# Patient Record
Sex: MALE | Race: WHITE | NOT HISPANIC OR LATINO | URBAN - METROPOLITAN AREA
[De-identification: names, ages, dates, MRNs, and addresses within clinical notes are randomized per-mention and may not be internally consistent; named-entity substitution may affect disease eponyms.]

---

## 2017-08-24 ENCOUNTER — GENERIC CONVERSION - ENCOUNTER (OUTPATIENT)
Dept: OTHER | Facility: OTHER | Age: 72
End: 2017-08-24

## 2017-09-01 ENCOUNTER — GENERIC CONVERSION - ENCOUNTER (OUTPATIENT)
Dept: OTHER | Facility: OTHER | Age: 72
End: 2017-09-01

## 2017-10-06 ENCOUNTER — GENERIC CONVERSION - ENCOUNTER (OUTPATIENT)
Dept: OTHER | Facility: OTHER | Age: 72
End: 2017-10-06

## 2017-11-09 ENCOUNTER — GENERIC CONVERSION - ENCOUNTER (OUTPATIENT)
Dept: OTHER | Facility: OTHER | Age: 72
End: 2017-11-09

## 2017-11-10 ENCOUNTER — GENERIC CONVERSION - ENCOUNTER (OUTPATIENT)
Dept: OTHER | Facility: OTHER | Age: 72
End: 2017-11-10

## 2017-12-01 ENCOUNTER — GENERIC CONVERSION - ENCOUNTER (OUTPATIENT)
Dept: OTHER | Facility: OTHER | Age: 72
End: 2017-12-01

## 2018-01-13 NOTE — MISCELLANEOUS
Message   Recorded as Task   Date: 11/09/2017 04:13 PM, Created By: Rosana Delgado   Task Name: Medical Complaint Callback   Assigned To: Mejia CRUZ,TEAM   Regarding Patient: Che Gutierrez, Status: Active   Comment:    Jil Bullock - 09 Nov 2017 4:13 PM     TASK CREATED  Caller: Latia, Adult Child; [phone]  Daughter calling with numerous questions regarding pt's incontinence & possible infection,an appointment was scheduled earlier for tomorrow which is difficult since pt's has dementia she would like to speak with nurse or Adrienne Maddox - 09 Nov 2017 4:52 PM     TASK EDITED  DAUGHTER WILL KEEP APPT UNLESS SHE HEARS FROM DR Yovani Huynh  FEELS NOTHING LEFT TO BE DONE IN OFFICE RE: TESTING  INCONTINENCE WORSE  SHE DOES NOT THINK DUMONT WOULD BE APPROPRIATE DUE TO DEMENTIA  WILL DIRECT TO DR Yovani Huynh  Active Problems    1  Urge incontinence of urine (134 31) (N39 41)    Current Meds   1  Ativan 1 MG Oral Tablet (LORazepam); Therapy: (Recorded:31Aug2017) to Recorded   2  Creon CAPS; Therapy: (Recorded:01Sep2017) to Recorded   3  Cyanocobalamin TABS; Therapy: (Recorded:31Aug2017) to Recorded   4  Daily Multivitamin TABS; Therapy: (Recorded:01Sep2017) to Recorded   5  Finasteride 5 MG Oral Tablet (Proscar); TAKE 1 TABLET DAILY AS DIRECTED    Requested for: [id number]; Last UT:[id number]; Status: ACTIVE - Transmit to Pharmacy   - Awaiting Verification Ordered   6  Lexapro 20 MG Oral Tablet (Escitalopram Oxalate); Therapy: (Recorded:31Aug2017) to Recorded   7  Myrbetriq 50 MG Oral Tablet Extended Release 24 Hour; Take 1 tablet daily; Therapy: 01Sep2017 to (Evaluate:28Jun2018); Last Rx:01Sep2017 Ordered   8  Oxybutynin Chloride 5 MG Oral Tablet; Therapy: (Recorded:01Sep2017) to Recorded   9  Questran 4 GM Oral Packet (Cholestyramine); Therapy: (Recorded:01Sep2017) to Recorded   10  RisperDAL 0 25 MG Oral Tablet (RisperiDONE);     Therapy: (Recorded:31Aug2017) to Recorded   11  Simvastatin 20 MG Oral Tablet; Therapy: (Recorded:31Aug2017) to Recorded   12  TraZODone HCl - 50 MG Oral Tablet; Therapy: (Recorded:01Sep2017) to Recorded   13  VESIcare 10 MG Oral Tablet; Therapy: (Recorded:31Aug2017) to Recorded   14  Vitamin B12 TABS; Therapy: (Recorded:01Sep2017) to Recorded   15  Vitamin B12 TABS; Therapy: (Recorded:31Aug2017) to Recorded   16  Vitamin D CAPS; Therapy: (Recorded:31Aug2017) to Recorded   17  Vitamin D3 CAPS; Therapy: (Recorded:01Sep2017) to Recorded   18  Vitamin E CAPS; Therapy: (Recorded:01Sep2017) to Recorded   19  Vitamin E CAPS; Therapy: (Recorded:31Aug2017) to Recorded   20  Xarelto 20 MG Oral Tablet; Therapy: (Recorded:01Sep2017) to Recorded   21  Xarelto 20 MG Oral Tablet; Therapy: (Recorded:31Aug2017) to Recorded    Allergies    1   No Known Drug Allergies    Signatures   Electronically signed by : Gianni Albarran RN; Nov 9 2017  4:53PM EST                       (Author)

## 2018-01-13 NOTE — MISCELLANEOUS
Message   Recorded as Task   Date: 08/24/2017 10:10 AM, Created By: Wendy Kendrick   Task Name: Call Back   Assigned To: Mejia CRUZ,TEAM   Regarding Patient: Shantelle Naylor, Status: Active   Comment:    Sakina Rivera - 24 Aug 2017 10:10 AM     TASK CREATED    Daughter [facility] calling has questions regarding medication  C/o increased incontinence [phone] or cell [phone]   Lalita Dorman - 24 Aug 2017 10:32 AM     TASK EDITED  Spoke with daughter and she stated that dad is getting worse  Explain to daughter per Dr Tono Machado last note pt should come in to have PVR and discussion  Latia stated that pt has dementia and is difficult to bring pt out  Pt has tried and failed several medications and needs to be seen          Signatures   Electronically signed by : Erwin Myles, ; Aug 24 2017 10:32AM EST                       (Author)

## 2018-01-14 NOTE — MISCELLANEOUS
Message   Recorded as Task   Date: 11/10/2017 08:24 AM, Created By: Michael Carroll   Task Name: Call Back   Assigned To: Mejia CRUZ,TEAM   Regarding Patient: Marianne Wen, Status: Active   CommentManda  - 10 Nov 2017 8:24 AM     TASK CREATED  Caller: Latia, Adult Child; Other  Patients daughter called saying that she needs to speak with an urse  She cant bring her dad in today because of the cold and how he feels  She wants to know if he can change his medication please call her back at [address] [facility] - 10 Nov 2017 8:53 AM     TASK EDITED  DAUGHTER WANTS TO TRY MEDICATION AGAIN, MAYBE QOD ON VESICARE  PT PRESENTLY ON MYRBETRIQ AND HAS TRIED DETROL BID  WILL DIRECT TO DR Jung Vargas  PER DR AMIN MAY TRY VESICARE 5 MG QOD, HOLD MYRBETRIQ FOR NOW  DAUGHTER NOTIFIED  MEDICATION ERX TO PHARM  1        1 Amended By: Henrik Stock; Nov 10 2017 12:37 PM EST    Active Problems   1  Urge incontinence of urine (788 31) (N39 41)    Current Meds  1  Ativan 1 MG Oral Tablet (LORazepam); Therapy: (Recorded:31Aug2017) to Recorded  2  Creon CAPS; Therapy: (Recorded:01Sep2017) to Recorded  3  Cyanocobalamin TABS; Therapy: (Recorded:36Azs0307) to Recorded  4  Daily Multivitamin TABS; Therapy: (Recorded:01Sep2017) to Recorded  5  Finasteride 5 MG Oral Tablet (Proscar); TAKE 1 TABLET DAILY AS DIRECTED    Requested for: [id number]; Last BF:[id number]; Status: ACTIVE - Transmit to Pharmacy   - Awaiting Verification Ordered  6  Lexapro 20 MG Oral Tablet (Escitalopram Oxalate); Therapy: (Recorded:75Skl7252) to Recorded  7  Myrbetriq 50 MG Oral Tablet Extended Release 24 Hour; Take 1 tablet daily; Therapy: 01Sep2017 to (Evaluate:43Hot9918); Last Rx:01Sep2017 Ordered  8  Oxybutynin Chloride 5 MG Oral Tablet; Therapy: (Recorded:01Sep2017) to Recorded  9  Questran 4 GM Oral Packet (Cholestyramine); Therapy: (Recorded:01Sep2017) to Recorded  10   RisperDAL 0 25 MG Oral Tablet (RisperiDONE); Therapy: (Recorded:31Aug2017) to Recorded  11  Simvastatin 20 MG Oral Tablet; Therapy: (Recorded:31Aug2017) to Recorded  12  TraZODone HCl - 50 MG Oral Tablet; Therapy: (Recorded:01Sep2017) to Recorded  13  VESIcare 10 MG Oral Tablet; Therapy: (Recorded:31Aug2017) to Recorded  14  Vitamin B12 TABS; Therapy: (Recorded:01Sep2017) to Recorded  15  Vitamin B12 TABS; Therapy: (Recorded:31Aug2017) to Recorded  16  Vitamin D CAPS; Therapy: (Recorded:31Aug2017) to Recorded  17  Vitamin D3 CAPS; Therapy: (Recorded:01Sep2017) to Recorded  18  Vitamin E CAPS; Therapy: (Recorded:01Sep2017) to Recorded  19  Vitamin E CAPS; Therapy: (Recorded:31Aug2017) to Recorded  20  Xarelto 20 MG Oral Tablet; Therapy: (Recorded:01Sep2017) to Recorded  21  Xarelto 20 MG Oral Tablet; Therapy: (Recorded:31Aug2017) to Recorded    Allergies   1   No Known Drug Allergies    Signatures   Electronically signed by : Jonathan Crowley RN; Nov 10 2017 12:37PM EST                       (Author)

## 2018-01-16 NOTE — MISCELLANEOUS
Message   Recorded as Task   Date: 10/06/2017 10:05 AM, Created By: Llao Florian   Task Name: Call Back   Assigned To: Mejia CRUZ,TEAM   Regarding Patient: Danielle Jose, Status: In Progress   Comment:    Sakina Rivera - 06 Oct 2017 10:05 AM     TASK CREATED    Pt's daughter Steven calling for lab results had PSA labs done at [facility] 10/3/17 and would like a call back with results  [phone] or cell [phone]   Adrienne Torres - 06 Oct 2017 10:27 AM     TASK EDITED  DIRECTED TO DR Az Osorio FOR REVIEW  Adrienne Torres - 06 Oct 2017 10:38 AM     TASK IN PROGRESS   Adrienne Torres - 06 Oct 2017 11:25 AM     TASK EDITED  PER DR AMIN THIS LEVEL OF 2 34 IS OKAY FOR THE PATIENT  LMOM FOR DAUGHTER TO CALL  Adrienne Torres - 06 Oct 2017 12:17 PM     TASK EDITED  DAUGHTER NOTIFIED  Active Problems    1  Urge incontinence of urine (788 31) (N39 41)    Current Meds   1  Ativan 1 MG Oral Tablet (LORazepam); Therapy: (Recorded:31Aug2017) to Recorded   2  Creon CAPS; Therapy: (Recorded:01Sep2017) to Recorded   3  Cyanocobalamin TABS; Therapy: (Recorded:31Aug2017) to Recorded   4  Daily Multivitamin TABS; Therapy: (Recorded:01Sep2017) to Recorded   5  Finasteride 5 MG Oral Tablet (Proscar); TAKE 1 TABLET DAILY AS DIRECTED    Requested for: [id number]; Last AA:15PUS9510; Status: ACTIVE - Transmit to Pharmacy   - Awaiting Verification Ordered   6  Lexapro 20 MG Oral Tablet (Escitalopram Oxalate); Therapy: (Recorded:26Kbs6658) to Recorded   7  Myrbetriq 50 MG Oral Tablet Extended Release 24 Hour; Take 1 tablet daily; Therapy: 01Sep2017 to (Evaluate:40Voh5366); Last Rx:01Sep2017 Ordered   8  Oxybutynin Chloride 5 MG Oral Tablet; Therapy: (Recorded:01Sep2017) to Recorded   9  Questran 4 GM Oral Packet (Cholestyramine); Therapy: (Recorded:01Sep2017) to Recorded   10  RisperDAL 0 25 MG Oral Tablet (RisperiDONE); Therapy: (Recorded:38Ymk7016) to Recorded   11   Simvastatin 20 MG Oral Tablet; Therapy: (Recorded:31Aug2017) to Recorded   12  TraZODone HCl - 50 MG Oral Tablet; Therapy: (Recorded:01Sep2017) to Recorded   13  VESIcare 10 MG Oral Tablet; Therapy: (Recorded:31Aug2017) to Recorded   14  Vitamin B12 TABS; Therapy: (Recorded:01Sep2017) to Recorded   15  Vitamin B12 TABS; Therapy: (Recorded:31Aug2017) to Recorded   16  Vitamin D CAPS; Therapy: (Recorded:31Aug2017) to Recorded   17  Vitamin D3 CAPS; Therapy: (Recorded:01Sep2017) to Recorded   18  Vitamin E CAPS; Therapy: (Recorded:01Sep2017) to Recorded   19  Vitamin E CAPS; Therapy: (Recorded:31Aug2017) to Recorded   20  Xarelto 20 MG Oral Tablet; Therapy: (Recorded:01Sep2017) to Recorded   21  Xarelto 20 MG Oral Tablet; Therapy: (Recorded:31Aug2017) to Recorded    Allergies    1   No Known Drug Allergies    Signatures   Electronically signed by : Branod Neville RN; Oct  6 2017 12:18PM EST                       (Author)

## 2018-01-22 VITALS
BODY MASS INDEX: 32.93 KG/M2 | DIASTOLIC BLOOD PRESSURE: 86 MMHG | HEIGHT: 70 IN | SYSTOLIC BLOOD PRESSURE: 134 MMHG | WEIGHT: 230 LBS

## 2018-01-24 VITALS — DIASTOLIC BLOOD PRESSURE: 86 MMHG | HEIGHT: 70 IN | SYSTOLIC BLOOD PRESSURE: 134 MMHG

## 2018-02-13 DIAGNOSIS — R35.0 FREQUENCY OF MICTURITION: Primary | ICD-10-CM

## 2018-02-13 RX ORDER — SOLIFENACIN SUCCINATE 5 MG/1
TABLET, FILM COATED ORAL
Qty: 30 TABLET | Refills: 0 | Status: SHIPPED | OUTPATIENT
Start: 2018-02-13 | End: 2018-02-15 | Stop reason: SDUPTHER

## 2018-02-15 ENCOUNTER — TELEPHONE (OUTPATIENT)
Dept: UROLOGY | Facility: AMBULATORY SURGERY CENTER | Age: 73
End: 2018-02-15

## 2018-02-15 DIAGNOSIS — R35.0 FREQUENCY OF MICTURITION: ICD-10-CM

## 2018-02-15 RX ORDER — SOLIFENACIN SUCCINATE 5 MG/1
5 TABLET, FILM COATED ORAL DAILY
Qty: 30 TABLET | Refills: 0 | Status: SHIPPED | OUTPATIENT
Start: 2018-02-15 | End: 2018-03-20 | Stop reason: SDUPTHER

## 2018-02-15 NOTE — TELEPHONE ENCOUNTER
Wife called  Dr Jessy Pinedo said to take medication once a day, but the bottle shows every other day  Her daughter came to the appt as well, and she also heard him say once a day  Now patient doesn't have enough medication and cannot get a refill  She would like to hear back from a nurse to straighten this out  Thank you

## 2018-02-15 NOTE — TELEPHONE ENCOUNTER
Per Dr Pati Mitchell last ov note 12/17 pt is suppose to take one daily  Corrected order and resubmitted to Dr Monge Arrow to Sign  Pt's wife was notified and apologized for the inconvenience and error

## 2018-03-20 DIAGNOSIS — R35.0 FREQUENCY OF MICTURITION: ICD-10-CM

## 2018-03-20 NOTE — TELEPHONE ENCOUNTER
Patient's wife called stating patient doing well on the Vesicare 5mg and she is requesting a refill  Patient is schedule to return to the office on 6/28/18 so the script was written for 30 day supply with 4 refills    Script queued and forwarded to Dr Charisse Schrader for approval

## 2018-03-21 RX ORDER — SOLIFENACIN SUCCINATE 5 MG/1
5 TABLET, FILM COATED ORAL DAILY
Qty: 30 TABLET | Refills: 4 | Status: SHIPPED | OUTPATIENT
Start: 2018-03-21 | End: 2018-09-19 | Stop reason: SDUPTHER

## 2018-09-19 DIAGNOSIS — R35.0 FREQUENCY OF MICTURITION: ICD-10-CM

## 2018-09-19 RX ORDER — SOLIFENACIN SUCCINATE 5 MG/1
5 TABLET, FILM COATED ORAL DAILY
Qty: 90 TABLET | Refills: 0 | Status: SHIPPED | OUTPATIENT
Start: 2018-09-19

## 2018-09-19 NOTE — TELEPHONE ENCOUNTER
An Auto-fax Refill Request for Vesicare 5mg was received from [address] [place] [address] [phone]  Patient was last seen in December, 2017 by Dr Dulce Granger; continuation of the medication was approved at that time  Patient due for next yearly exam December, 2018 but no appointment has been scheduled  A message was attached to the prescription that patient needs to call and schedule an office visit  Additional refills will NOT be issued until the patient is seen in the office   Script for same, 90 day supply with NO refills was queued and forwarded to Dr Dulce Granger for approval

## 2018-10-22 DIAGNOSIS — N13.8 BPH WITH URINARY OBSTRUCTION: Primary | ICD-10-CM

## 2018-10-22 DIAGNOSIS — N40.1 BPH WITH URINARY OBSTRUCTION: Primary | ICD-10-CM

## 2018-10-22 RX ORDER — FINASTERIDE 5 MG/1
TABLET, FILM COATED ORAL
Qty: 90 TABLET | Refills: 2 | Status: SHIPPED | OUTPATIENT
Start: 2018-10-22